# Patient Record
Sex: FEMALE | URBAN - METROPOLITAN AREA
[De-identification: names, ages, dates, MRNs, and addresses within clinical notes are randomized per-mention and may not be internally consistent; named-entity substitution may affect disease eponyms.]

---

## 2018-12-20 ENCOUNTER — APPOINTMENT (RX ONLY)
Dept: URBAN - METROPOLITAN AREA CLINIC 129 | Facility: CLINIC | Age: 73
Setting detail: DERMATOLOGY
End: 2018-12-20

## 2018-12-20 DIAGNOSIS — Z41.9 ENCOUNTER FOR PROCEDURE FOR PURPOSES OTHER THAN REMEDYING HEALTH STATE, UNSPECIFIED: ICD-10-CM

## 2018-12-20 PROCEDURE — ? BOTOX (U OR CC)

## 2018-12-20 NOTE — PROCEDURE: BOTOX (U OR CC)
Inferior Lateral Orbicularis Oculi Units: 0
Forehead Units/Cc: 8
Periorbital Skin Units/Cc: 4
Price (Use Numbers Only, No Special Characters Or $): 384.00
Detail Level: Detailed
Consent: Written consent obtained. Risks include but not limited to lid/brow ptosis, bruising, swelling, diplopia, temporary effect, incomplete chemical denervation.
Glabellar Complex Units: 16
Document As Units Or Cc?: units
Post-Care Instructions: Patient instructed to not lie down for 4 hours and limit physical activity for 24 hours. Patient instructed not to travel by airplane for 48 hours.

## 2019-05-02 ENCOUNTER — APPOINTMENT (RX ONLY)
Dept: URBAN - METROPOLITAN AREA CLINIC 129 | Facility: CLINIC | Age: 74
Setting detail: DERMATOLOGY
End: 2019-05-02

## 2019-05-02 DIAGNOSIS — L98.8 OTHER SPECIFIED DISORDERS OF THE SKIN AND SUBCUTANEOUS TISSUE: ICD-10-CM

## 2019-05-02 PROCEDURE — ? BOTOX

## 2019-05-02 PROCEDURE — ? RESTYLANE DEFYNE INJECTION

## 2019-05-02 PROCEDURE — ? VOLBELLA INJECTION

## 2019-05-02 NOTE — PROCEDURE: BOTOX
Expiration Date (Month Year): 10/01/21
Additional Area 2 Units: 0
Detail Level: Zone
Forehead Units: 8
Post-Care Instructions: Patient instructed to not lie down for 4 hours and limit physical activity for 24 hours. Patient instructed not to travel by airplane for 48 hours.
Lot #: [id number]
Glabellar Complex Units: 16
Dilution (U/0.1 Cc): 4
Consent: Written consent obtained. Risks include but not limited to lid/brow ptosis, bruising, swelling, diplopia, temporary effect, incomplete chemical denervation.
Price (Use Numbers Only, No Special Characters Or $): 384

## 2019-05-02 NOTE — PROCEDURE: VOLBELLA INJECTION
Include Cannula Information In Note?: No
Brows Filler Volume In Cc: 0
Lot #: [id number]
Consent: Written consent obtained. Risks include but not limited to bruising, beading, irregular texture, ulceration, infection, allergic reaction, scar formation, incomplete augmentation, temporary nature, procedural pain.
Expiration Date (Month Year): 09/04/20
Procedural Text: The filler was administered to the treatment areas noted above.\\nDental block consisted of 3 cc total = .2 Na Bicarbonate, 1.4 lido with epi, and 1.4 plain lido
Filler: Juvederm Volbella XC
Anesthesia Type: 0.3% lidocaine (mixed within filler)
Map Statment: See Attach Map for Complete Details
Detail Level: Zone
Vermilion Lips Filler Volume In Cc: 1
Price (Use Numbers Only, No Special Characters Or $): 095
Post-Care Instructions: Patient instructed to apply ice to reduce swelling. Pt stayed at office and iced for 30 minutes before driving home to [place]. Has arnica tablets that she started pre injection

## 2019-08-26 ENCOUNTER — APPOINTMENT (RX ONLY)
Dept: URBAN - METROPOLITAN AREA CLINIC 129 | Facility: CLINIC | Age: 74
Setting detail: DERMATOLOGY
End: 2019-08-26

## 2019-08-26 DIAGNOSIS — L98.8 OTHER SPECIFIED DISORDERS OF THE SKIN AND SUBCUTANEOUS TISSUE: ICD-10-CM

## 2019-08-26 PROCEDURE — ? BOTOX

## 2019-08-26 NOTE — PROCEDURE: BOTOX
Inferior Lateral Orbicularis Oculi Units: 0
Nasal Root Units: 4
Post-Care Instructions: Patient instructed to not lie down for 4 hours and limit physical activity for 24 hours. Patient instructed not to travel by airplane for 48 hours.
Forehead Units: 8
Glabellar Complex Units: 16
Lot #: [id number]
Detail Level: Zone
Price (Use Numbers Only, No Special Characters Or $): 384
Expiration Date (Month Year): 10/01/21
Consent: Written consent obtained. Risks include but not limited to lid/brow ptosis, bruising, swelling, diplopia, temporary effect, incomplete chemical denervation.

## 2019-09-19 ENCOUNTER — APPOINTMENT (RX ONLY)
Dept: URBAN - METROPOLITAN AREA CLINIC 129 | Facility: CLINIC | Age: 74
Setting detail: DERMATOLOGY
End: 2019-09-19

## 2019-09-19 DIAGNOSIS — L98.8 OTHER SPECIFIED DISORDERS OF THE SKIN AND SUBCUTANEOUS TISSUE: ICD-10-CM

## 2019-09-19 DIAGNOSIS — L82.0 INFLAMED SEBORRHEIC KERATOSIS: ICD-10-CM

## 2019-09-19 PROBLEM — J45.909 UNSPECIFIED ASTHMA, UNCOMPLICATED: Status: ACTIVE | Noted: 2019-09-19

## 2019-09-19 PROCEDURE — 99213 OFFICE O/P EST LOW 20 MIN: CPT

## 2019-09-19 PROCEDURE — ? BOTOX

## 2019-09-19 PROCEDURE — ? COUNSELING

## 2019-09-19 PROCEDURE — ? ADDITIONAL NOTES

## 2019-09-19 ASSESSMENT — LOCATION SIMPLE DESCRIPTION DERM
LOCATION SIMPLE: RIGHT FOREHEAD
LOCATION SIMPLE: LEFT NOSE
LOCATION SIMPLE: LEFT FOREHEAD
LOCATION SIMPLE: RIGHT TEMPLE
LOCATION SIMPLE: RIGHT NOSE
LOCATION SIMPLE: LEFT TEMPLE

## 2019-09-19 ASSESSMENT — LOCATION DETAILED DESCRIPTION DERM
LOCATION DETAILED: LEFT FOREHEAD
LOCATION DETAILED: RIGHT NASAL SIDEWALL
LOCATION DETAILED: RIGHT MID TEMPLE
LOCATION DETAILED: LEFT MID TEMPLE
LOCATION DETAILED: LEFT INFERIOR MEDIAL FOREHEAD
LOCATION DETAILED: RIGHT INFERIOR TEMPLE
LOCATION DETAILED: LEFT INFERIOR TEMPLE
LOCATION DETAILED: RIGHT INFERIOR MEDIAL FOREHEAD
LOCATION DETAILED: LEFT NASAL SIDEWALL

## 2019-09-19 ASSESSMENT — LOCATION ZONE DERM
LOCATION ZONE: NOSE
LOCATION ZONE: FACE

## 2019-09-19 NOTE — PROCEDURE: ADDITIONAL NOTES
Additional Notes: touch up of 8 units to \"bunny lines\" lateral nasal sidewalls, due to pt dissatisfaction, excessive movement despite recently being treated here with Botox, charge $0
Detail Level: Simple

## 2019-09-19 NOTE — PROCEDURE: BOTOX
Glabellar Complex Units: 4
Consent: Written consent obtained. Risks include but not limited to lid/brow ptosis, bruising, swelling, diplopia, temporary effect, incomplete chemical denervation.
Additional Area 3 Units: 0
Expiration Date (Month Year): 10/01/2021
Additional Area 1 Location: above left eyebrow
Lot #: [id number]
Post-Care Instructions: Patient instructed to not lie down for 4 hours and limit physical activity for 24 hours. Patient instructed not to travel by airplane for 48 hours.
Price (Use Numbers Only, No Special Characters Or $): 144
Detail Level: Detailed
Nasal Root Units: 8

## 2019-12-05 ENCOUNTER — APPOINTMENT (RX ONLY)
Dept: URBAN - METROPOLITAN AREA CLINIC 129 | Facility: CLINIC | Age: 74
Setting detail: DERMATOLOGY
End: 2019-12-05

## 2019-12-05 DIAGNOSIS — L98.8 OTHER SPECIFIED DISORDERS OF THE SKIN AND SUBCUTANEOUS TISSUE: ICD-10-CM

## 2019-12-05 PROCEDURE — ? BOTOX

## 2019-12-05 PROCEDURE — ? ADDITIONAL NOTES

## 2019-12-05 PROCEDURE — ? RESTYLANE REFYNE INJECTION

## 2019-12-05 ASSESSMENT — LOCATION ZONE DERM: LOCATION ZONE: FACE

## 2019-12-05 ASSESSMENT — LOCATION SIMPLE DESCRIPTION DERM
LOCATION SIMPLE: RIGHT CHEEK
LOCATION SIMPLE: LEFT CHEEK

## 2019-12-05 ASSESSMENT — LOCATION DETAILED DESCRIPTION DERM
LOCATION DETAILED: RIGHT LATERAL MALAR CHEEK
LOCATION DETAILED: LEFT LATERAL MALAR CHEEK

## 2019-12-05 NOTE — PROCEDURE: BOTOX
Show Topical Anesthesia: Yes
Masseter Units: 0
Nasal Root Units: 4
Lot #: [id number]
Expiration Date (Month Year): 10/01/21
Forehead Units: 10
Detail Level: Zone
Price (Use Numbers Only, No Special Characters Or $): 408
Periorbital Skin Units: 16
Consent: Written consent obtained. Risks include but not limited to lid/brow ptosis, bruising, swelling, diplopia, temporary effect, incomplete chemical denervation.
Post-Care Instructions: Patient instructed to not lie down for 4 hours and limit physical activity for 24 hours. Patient instructed not to travel by airplane for 48 hours.

## 2019-12-05 NOTE — PROCEDURE: RESTYLANE REFYNE INJECTION
Additional Area 4 Volume In Cc: 0
Price (Use Numbers Only, No Special Characters Or $): 1100
Vermilion Lips Filler Volume In Cc: 0.2
Additional Anesthesia Volume In Cc: 6
Marionette Lines Filler Volume In Cc: 0.6
Number Of Syringes (Required For Inventory): 2
Tear Troughs Filler Volume In Cc: 0.8
Nasolabial Folds Filler Volume In Cc: 0.4
Anesthesia Volume In Cc: 0.5
Filler: Restylane Refyne
Use Map Statement For Sites (Optional): No
Procedural Text: The filler was administered to the treatment areas noted above.
Lot #: [id number]
Post-Care Instructions: Patient instructed to apply ice to reduce swelling.
Map Statement: See Attach Map for Complete Details
Expiration Date (Month Year): 05/31/2020
Detail Level: Detailed
Consent: Written consent obtained. Risks include but not limited to bruising, beading, irregular texture, ulceration, infection, allergic reaction, scar formation, incomplete augmentation, temporary nature, procedural pain.

## 2020-01-07 ENCOUNTER — APPOINTMENT (RX ONLY)
Dept: URBAN - METROPOLITAN AREA CLINIC 129 | Facility: CLINIC | Age: 75
Setting detail: DERMATOLOGY
End: 2020-01-07

## 2020-01-07 DIAGNOSIS — L98.8 OTHER SPECIFIED DISORDERS OF THE SKIN AND SUBCUTANEOUS TISSUE: ICD-10-CM

## 2020-01-07 PROCEDURE — ? RESTYLANE LYFT INJECTION

## 2020-01-07 PROCEDURE — ? ADDITIONAL NOTES

## 2020-01-07 ASSESSMENT — LOCATION DETAILED DESCRIPTION DERM
LOCATION DETAILED: LEFT LATERAL MALAR CHEEK
LOCATION DETAILED: LEFT INFERIOR CENTRAL MALAR CHEEK
LOCATION DETAILED: LEFT LATERAL BUCCAL CHEEK

## 2020-01-07 ASSESSMENT — LOCATION SIMPLE DESCRIPTION DERM: LOCATION SIMPLE: LEFT CHEEK

## 2020-01-07 ASSESSMENT — LOCATION ZONE DERM: LOCATION ZONE: FACE

## 2020-01-07 NOTE — PROCEDURE: RESTYLANE LYFT INJECTION
Use Map Statement For Sites (Optional): Yes
Price (Use Numbers Only, No Special Characters Or $): 450.00
Dorsal Hands Filler Volume In Cc: 0
Filler: Paige Cardenas
Post-Care Instructions: Patient instructed to apply ice to reduce swelling.
Lot #: [id number]
Additional Anesthesia Volume In Cc: 6
Procedural Text: The filler was administered to the treatment areas noted above.
Detail Level: Zone
Expiration Date (Month Year): 04/30/22
Map Statement: See Attach Map for Complete Details
Anesthesia Volume In Cc: 0.5
Consent: Written consent obtained. Risks include but not limited to bruising, beading, irregular texture, ulceration, infection, allergic reaction, scar formation, incomplete augmentation, temporary nature, procedural pain.
Include Cannula Information In Note?: No
Number Of Syringes (Required For Inventory): 1

## 2020-07-13 ENCOUNTER — APPOINTMENT (RX ONLY)
Dept: URBAN - METROPOLITAN AREA CLINIC 129 | Facility: CLINIC | Age: 75
Setting detail: DERMATOLOGY
End: 2020-07-13

## 2020-07-13 DIAGNOSIS — L98.8 OTHER SPECIFIED DISORDERS OF THE SKIN AND SUBCUTANEOUS TISSUE: ICD-10-CM

## 2020-07-13 PROCEDURE — ? RESTYLANE LYFT INJECTION

## 2020-07-13 PROCEDURE — ? ADDITIONAL NOTES

## 2020-07-13 PROCEDURE — ? BOTOX

## 2020-07-13 ASSESSMENT — LOCATION ZONE DERM: LOCATION ZONE: FACE

## 2020-07-13 ASSESSMENT — LOCATION SIMPLE DESCRIPTION DERM: LOCATION SIMPLE: LEFT CHEEK

## 2020-07-13 NOTE — PROCEDURE: RESTYLANE LYFT INJECTION
Additional Area 1 Volume In Cc: 0
Price (Use Numbers Only, No Special Characters Or $): 740
Post-Care Instructions: Patient instructed to apply ice to reduce swelling.
Map Statement: See Attach Map for Complete Details
Use Map Statement For Sites (Optional): Yes
Consent: Written consent obtained. Risks include but not limited to bruising, beading, irregular texture, ulceration, infection, allergic reaction, scar formation, incomplete augmentation, temporary nature, procedural pain.
Detail Level: Zone
Procedural Text: The filler was administered to the treatment areas noted above.
Expiration Date (Month Year): 04/30/22
Number Of Syringes (Required For Inventory): 1
Additional Anesthesia Volume In Cc: 6
Lot #: [id number]
Anesthesia Volume In Cc: 0.5
Include Cannula Information In Note?: No
Filler: Paige Cardenas

## 2020-07-13 NOTE — PROCEDURE: BOTOX
Show Right And Left Pupillary Line Units: No
Additional Area 6 Units: 0
Show Topical Anesthesia: Yes
Nasal Root Units: 4
Glabellar Complex Units: 16
Lot #: [id number]
Price (Use Numbers Only, No Special Characters Or $): 118
Post-Care Instructions: Patient instructed to not lie down for 4 hours and limit physical activity for 24 hours. Patient instructed not to travel by airplane for 48 hours.
Consent: Written consent obtained. Risks include but not limited to lid/brow ptosis, bruising, swelling, diplopia, temporary effect, incomplete chemical denervation.
Detail Level: Detailed
Expiration Date (Month Year): 10/01/2021

## 2020-11-13 NOTE — PROCEDURE: RESTYLANE DEFYNE INJECTION
Jawline Filler Volume In Cc: 0
Procedural Text: The filler was administered to the treatment areas noted above.
Filler: Restylane Defyne
Expiration Date (Month Year): 10/31/20
Post-Care Instructions: Patient instructed to apply ice to reduce swelling.
Map Statement: See Attach Map for Complete Details
Use Map Statement For Sites (Optional): No
Price (Use Numbers Only, No Special Characters Or $): 718
Nasolabial Folds Filler Volume In Cc: 1
Detail Level: Zone
Lot #: [id number]
Anesthesia Type: 0.2% lidocaine (mixed within filler)
Consent: Written consent obtained. Risks include but not limited to bruising, beading, irregular texture, ulceration, infection, allergic reaction, scar formation, incomplete augmentation, temporary nature, procedural pain.
Private car

## 2021-05-20 ENCOUNTER — APPOINTMENT (RX ONLY)
Dept: URBAN - METROPOLITAN AREA CLINIC 129 | Facility: CLINIC | Age: 76
Setting detail: DERMATOLOGY
End: 2021-05-20

## 2021-05-20 DIAGNOSIS — L98.8 OTHER SPECIFIED DISORDERS OF THE SKIN AND SUBCUTANEOUS TISSUE: ICD-10-CM

## 2021-05-20 PROBLEM — L29.8 OTHER PRURITUS: Status: ACTIVE | Noted: 2021-05-20

## 2021-05-20 PROCEDURE — ? BOTOX

## 2021-05-20 NOTE — PROCEDURE: BOTOX
Consent: Written consent obtained. Risks include but not limited to lid/brow ptosis, bruising, swelling, diplopia, temporary effect, incomplete chemical denervation.
Forehead Units: 8
Show Additional Area 4: Yes
Lateral Platysmal Bands Units: 0
Dilution (U/0.1 Cc): 4
Price (Use Numbers Only, No Special Characters Or $): 384
Post-Care Instructions: Patient instructed to not lie down for 4 hours and limit physical activity for 24 hours. Patient instructed not to travel by airplane for 48 hours.
Show Ucl Units: No
Lot #: [id number]
Expiration Date (Month Year): 08/31/2023
Detail Level: Detailed
Glabellar Complex Units: 16

## 2021-05-27 ENCOUNTER — APPOINTMENT (RX ONLY)
Dept: URBAN - METROPOLITAN AREA CLINIC 129 | Facility: CLINIC | Age: 76
Setting detail: DERMATOLOGY
End: 2021-05-27

## 2021-05-27 DIAGNOSIS — L98.8 OTHER SPECIFIED DISORDERS OF THE SKIN AND SUBCUTANEOUS TISSUE: ICD-10-CM

## 2021-05-27 PROCEDURE — ? BOTOX

## 2021-05-27 NOTE — PROCEDURE: BOTOX
Additional Area 5 Units: 0
Show Levator Superior Units: Yes
Detail Level: Detailed
Show Right And Left Pupillary Line Units: No
Nasal Root Units: 4
Post-Care Instructions: Patient instructed to not lie down for 4 hours and limit physical activity for 24 hours. Patient instructed not to travel by airplane for 48 hours.
Price (Use Numbers Only, No Special Characters Or $): 144
Lot #: [id number]
Consent: Written consent obtained. Risks include but not limited to lid/brow ptosis, bruising, swelling, diplopia, temporary effect, incomplete chemical denervation.
Expiration Date (Month Year): 08/31/2023

## 2023-06-14 ENCOUNTER — APPOINTMENT (RX ONLY)
Dept: URBAN - METROPOLITAN AREA CLINIC 129 | Facility: CLINIC | Age: 78
Setting detail: DERMATOLOGY
End: 2023-06-14

## 2023-06-14 DIAGNOSIS — L82.0 INFLAMED SEBORRHEIC KERATOSIS: ICD-10-CM

## 2023-06-14 DIAGNOSIS — L82.1 OTHER SEBORRHEIC KERATOSIS: ICD-10-CM

## 2023-06-14 PROCEDURE — 17110 DESTRUCTION B9 LES UP TO 14: CPT

## 2023-06-14 PROCEDURE — ? COUNSELING

## 2023-06-14 PROCEDURE — 99202 OFFICE O/P NEW SF 15 MIN: CPT | Mod: 25

## 2023-06-14 PROCEDURE — ? LIQUID NITROGEN

## 2023-06-14 ASSESSMENT — LOCATION ZONE DERM
LOCATION ZONE: TRUNK
LOCATION ZONE: FACE

## 2023-06-14 ASSESSMENT — LOCATION DETAILED DESCRIPTION DERM
LOCATION DETAILED: LEFT SUPERIOR FOREHEAD
LOCATION DETAILED: LEFT FOREHEAD
LOCATION DETAILED: LEFT LATERAL BREAST 2-3:00 REGION

## 2023-06-14 ASSESSMENT — LOCATION SIMPLE DESCRIPTION DERM
LOCATION SIMPLE: LEFT BREAST
LOCATION SIMPLE: LEFT FOREHEAD

## 2023-06-14 NOTE — PROCEDURE: LIQUID NITROGEN
Post-Care Instructions: I reviewed with the patient in detail post-care instructions. Patient is to wear sunprotection, and avoid picking at any of the treated lesions. Pt may apply Vaseline to crusted or scabbing areas.
Show Applicator Variable?: Yes
Include Z78.9 (Other Specified Conditions Influencing Health Status) As An Associated Diagnosis?: No
Detail Level: Detailed
Consent: The patient's consent was obtained including but not limited to risks of crusting, scabbing, blistering, scarring, darker or lighter pigmentary change, recurrence, incomplete removal and infection.
Spray Paint Text: The liquid nitrogen was applied to the skin utilizing a spray paint frosting technique.
Medical Necessity Clause: This procedure was medically necessary because the lesions that were treated were:
Medical Necessity Information: It is in your best interest to select a reason for this procedure from the list below. All of these items fulfill various CMS LCD requirements except the new and changing color options.